# Patient Record
Sex: MALE | Race: WHITE
[De-identification: names, ages, dates, MRNs, and addresses within clinical notes are randomized per-mention and may not be internally consistent; named-entity substitution may affect disease eponyms.]

---

## 2017-04-07 ENCOUNTER — HOSPITAL ENCOUNTER (OUTPATIENT)
Dept: HOSPITAL 80 - FED | Age: 72
Setting detail: OBSERVATION
LOS: 1 days | Discharge: HOME | End: 2017-04-08
Attending: INTERNAL MEDICINE | Admitting: INTERNAL MEDICINE
Payer: COMMERCIAL

## 2017-04-07 DIAGNOSIS — E78.5: ICD-10-CM

## 2017-04-07 DIAGNOSIS — R79.89: ICD-10-CM

## 2017-04-07 DIAGNOSIS — Z82.49: ICD-10-CM

## 2017-04-07 DIAGNOSIS — I34.0: ICD-10-CM

## 2017-04-07 DIAGNOSIS — I48.91: Primary | ICD-10-CM

## 2017-04-07 DIAGNOSIS — Z87.891: ICD-10-CM

## 2017-04-07 LAB
% IMMATURE GRANULYOCYTES: 0.6 % (ref 0–1.1)
ABSOLUTE IMMATURE GRANULOCYTES: 0.06 10^3/UL (ref 0–0.1)
ABSOLUTE NRBC COUNT: 0 10^3/UL (ref 0–0.01)
ADD DIFF?: NO
ADD MORPH?: NO
ADD SCAN?: NO
ANION GAP SERPL CALC-SCNC: 11 MEQ/L (ref 8–16)
ATYPICAL LYMPHOCYTE FLAG: 0 (ref 0–99)
CALCIUM SERPL-MCNC: 10 MG/DL (ref 8.5–10.4)
CHLORIDE SERPL-SCNC: 105 MEQ/L (ref 97–110)
CO2 SERPL-SCNC: 25 MEQ/L (ref 22–31)
CREAT SERPL-MCNC: 1.3 MG/DL (ref 0.7–1.3)
ERYTHROCYTE [DISTWIDTH] IN BLOOD BY AUTOMATED COUNT: 13.3 % (ref 11.5–15.2)
FRAGMENT RBC FLAG: 0 (ref 0–99)
GFR SERPL CREATININE-BSD FRML MDRD: 54 ML/MIN/{1.73_M2}
GLUCOSE SERPL-MCNC: 107 MG/DL (ref 70–100)
HCT VFR BLD CALC: 47.3 % (ref 40–51)
HGB BLD-MCNC: 15.7 G/DL (ref 13.7–17.5)
LEFT SHIFT FLG: 0 (ref 0–99)
LIPEMIA HEMOLYSIS FLAG: 80 (ref 0–99)
MCH RBC BLDCO QN: 30.8 PG (ref 27.9–34.1)
MCHC RBC AUTO-ENTMCNC: 33.2 G/DL (ref 32.4–36.7)
MCV RBC AUTO: 92.9 FL (ref 81.5–99.8)
NRBC-AUTO%: 0 % (ref 0–0.2)
PLATELET # BLD: 197 10^3/UL (ref 150–400)
PLATELET CLUMPS FLAG: 0 (ref 0–99)
PMV BLD AUTO: 10.1 FL (ref 8.7–11.7)
POTASSIUM SERPL-SCNC: 4.1 MEQ/L (ref 3.5–5.2)
RBC # BLD AUTO: 5.09 10^6/UL (ref 4.4–6.38)
SODIUM SERPL-SCNC: 141 MEQ/L (ref 134–144)
TROPONIN I SERPL-MCNC: 0.09 NG/ML (ref 0–0.03)

## 2017-04-07 PROCEDURE — G0378 HOSPITAL OBSERVATION PER HR: HCPCS

## 2017-04-07 PROCEDURE — B245ZZ4 ULTRASONOGRAPHY OF LEFT HEART, TRANSESOPHAGEAL: ICD-10-PCS | Performed by: INTERNAL MEDICINE

## 2017-04-07 PROCEDURE — 96361 HYDRATE IV INFUSION ADD-ON: CPT

## 2017-04-07 PROCEDURE — 99285 EMERGENCY DEPT VISIT HI MDM: CPT

## 2017-04-07 PROCEDURE — 93005 ELECTROCARDIOGRAM TRACING: CPT

## 2017-04-07 PROCEDURE — 5A2204Z RESTORATION OF CARDIAC RHYTHM, SINGLE: ICD-10-PCS | Performed by: INTERNAL MEDICINE

## 2017-04-07 PROCEDURE — 96372 THER/PROPH/DIAG INJ SC/IM: CPT

## 2017-04-07 PROCEDURE — 93312 ECHO TRANSESOPHAGEAL: CPT

## 2017-04-07 PROCEDURE — 96374 THER/PROPH/DIAG INJ IV PUSH: CPT

## 2017-04-07 RX ADMIN — SODIUM CHLORIDE SCH MLS: 900 INJECTION, SOLUTION INTRAVENOUS at 23:00

## 2017-04-07 NOTE — EDPHY
H & P


Stated Complaint: tachycardia started while bike riding today, mild "indigestion

" currently


HPI/ROS: 





CHIEF COMPLAINT: Tachycardia





HISTORY OF PRESENT ILLNESS: The patient is a 73 y/o male, with a history of 

hypertension, arriving with his wife complaining of tachycardia he noticed just 

before exercising this morning. He noticed that his heart rate was elevated 

because he wears an exercise heart rate monitor; he denies associated chest pain

, palpitations, or dyspnea at any point. The monitor read a rate between 150-220

, but he thought it was inaccurate and chose to ride 20 miles on his bike 

regardless. He did not develop symptoms while riding, but currently feels "like 

I ate too much" and mildly lightheaded if he sits up. He has never felt these 

symptoms previously. He denies leg swelling, recent illness, vomiting, nausea, 

or recent trauma. He only drinks one cup of coffee daily and denies other 

caffeine use.





He reports mildly elevated lipids, but is not on medication currently. He 

denies history of diabetes or coronary artery disease. He is a former smoker 

and both parents had heart disease in old age. He takes a baby aspirin daily.





REVIEW OF SYSTEMS:





A ten point review of systems was performed and is negative with the exception 

of the items mentioned in the HPI.








Source: Patient


Exam Limitations: No limitations





- Personal History


Current Tetanus/Diphtheria Vaccine: Unsure


Current Tetanus Diphtheria and Acellular Pertussis (TDAP): Unsure





- Medical/Surgical History


PMH: 


PMH includes:


1. Hypertension - Losartan HTCZ


2. Right clavicle fracture and repair


3. Cataract surgery


4. Inguinal hernia repair





Family history: Father had CABG and stroke in his 80s; mother had unknown heart 

problems as well





PCP: Dr. Farley


Hx Asthma: No


Hx Chronic Respiratory Disease: No


Hx Diabetes: No


Hx Cardiac Disease: No


Hx Renal Disease: No


Hx Cirrhosis: No


Hx Alcoholism: No


Hx HIV/AIDS: No


Hx Splenectomy or Spleen Trauma: No


Other PMH: hypertension





- Social History


Smoking Status: Never smoked


Additional Social History: 





One cup of coffee daily, no other caffeine use. Rare alcohol use. Former smoker

, quit 20 years ago. No illicit drugs. Retired, former , and 

works on bike repair.








- Physical Exam


Exam: 





General Appearance:  Alert.  Vital signs reviewed. 109/76,  range


Eyes:  Pupils equal and round, no conjunctival injection, no discharge. 

Anicteric.


ENT, Mouth:  Mucous membranes are moist, no oropharyngeal erythema or edema.


Neck:  No lymphadenopathy, supple. No carotid bruit.


Respiratory:  Lungs are clear to auscultation; no wheezes, rales, or rhonchi.


Cardiovascular:  Tachycardic irregularly irregular heart rate; no murmur, rub, 

or gallop.


Gastrointestinal:  Abdomen is soft and nontender, no masses or organomegaly, 

bowel sounds normal.


Skin:  Warm and dry, no rashes on exposed skin, normal color.


Back:  Nontender to palpation over the thoracolumbar spine. No CVAT.


Extremities:  No lower extremity edema, no calf tenderness or swelling.


Neurological:  Alert and oriented.  Moving all four extremities easily and 

equally.


Psychiatric:  Normal affect.





Constitutional: 


 Initial Vital Signs











Temperature (C)  36.4 C   04/07/17 14:22


 


Heart Rate  122 H  04/07/17 14:22


 


Respiratory Rate  16   04/07/17 14:22


 


Blood Pressure  125/100 H  04/07/17 14:22


 


O2 Sat (%)  97   04/07/17 14:22








 











O2 Delivery Mode               Nasal Cannula


 


O2 (L/minute)                  2














Allergies/Adverse Reactions: 


 





No Known Allergies Allergy (Unverified 04/07/17 14:20)


 








Home Medications: 














 Medication  Instructions  Recorded


 


Aspirin [Aspirin 81mg (*)] 81 mg PO DAILY 04/07/17


 


Cholecalciferol Vit D3 [Vitamin D3 2,000 iunits PO DAILY 04/07/17





(*)]  


 


Cyanocobalamin [Vitamin B12 (*)] 2,000 mcg PO DAILY 04/07/17


 


Losartan Potassium [Cozaar 50 mg 50 mg PO DAILY 04/07/17





(*)]  


 


Multivitamins [Multivitamin (*)] 1 each PO DAILY 04/07/17


 


Omega-3 Fatty Acids [Fish Oil 1000 1,000 mg PO DAILY 04/07/17





mg (*)]  














Medical Decision Making





- Diagnostics


EKG Interpretation: 





1426: The 12 lead EKG was interpreted by myself. Rapid atrial fibrillation rate 

around 140, RBBB. See hard copy and/or "tracemaster" electronic copy for 

interpretation.





1556: Repeat 12 lead EKG was interpreted by myself.Atrial fibrillation rate 

around 110, RBBB. See hard copy and/or "tracemaster" electronic copy for 

interpretation.


ED Course/Re-evaluation: 





IV established. Labs drawn including CBC, CHEM, troponin, TSH. Patient placed 

on cardiac monitor. 1L IV NS and Diltazem IV 10 mg will be administered. 





Patient's troponin is elevated at .086. His EKG shows rapid atrial 

fibrillation. His rate has decreased to the 110 range since Diltazem 10 mg IV 

was given.  After the I viewed the ties in his blood pressure did decrease 

slightly; he has had systolic blood pressures just under 100. He will require 

admission for further cardiac work up.  He is given lovenox SQ. I discussed 

this with the patient and he agrees to admission. 





1530: Dr. Fernandez accepts admission.





He has remained stable without chest pain or shortness of breath during his 

stay in the emergency department.


Labs reviewed.


Differential Diagnosis: 





Considered a differential diagnosis that includes but is not limited to 

myocardial ischemia, electrolyte disorder, valvular disease, and hypertension.





- Data Points


Laboratory Results: 


 Laboratory Results





 04/07/17 14:40 





 04/07/17 14:40 





 











  04/07/17 04/07/17





  14:40 14:40


 


WBC    10.15 10^3/uL H 10^3/uL





    (3.80-9.50) 


 


RBC    5.09 10^6/uL 10^6/uL





    (4.40-6.38) 


 


Hgb    15.7 g/dL g/dL





    (13.7-17.5) 


 


Hct    47.3 % %





    (40.0-51.0) 


 


MCV    92.9 fL fL





    (81.5-99.8) 


 


MCH    30.8 pg pg





    (27.9-34.1) 


 


MCHC    33.2 g/dL g/dL





    (32.4-36.7) 


 


RDW    13.3 % %





    (11.5-15.2) 


 


Plt Count    197 10^3/uL 10^3/uL





    (150-400) 


 


MPV    10.1 fL fL





    (8.7-11.7) 


 


Neut % (Auto)    66.7 % %





    (39.3-74.2) 


 


Lymph % (Auto)    21.7 % %





    (15.0-45.0) 


 


Mono % (Auto)    8.8 % %





    (4.5-13.0) 


 


Eos % (Auto)    1.3 % %





    (0.6-7.6) 


 


Baso % (Auto)    0.9 % %





    (0.3-1.7) 


 


Nucleat RBC Rel Count    0.0 % %





    (0.0-0.2) 


 


Absolute Neuts (auto)    6.78 10^3/uL H 10^3/uL





    (1.70-6.50) 


 


Absolute Lymphs (auto)    2.20 10^3/uL 10^3/uL





    (1.00-3.00) 


 


Absolute Monos (auto)    0.89 10^3/uL H 10^3/uL





    (0.30-0.80) 


 


Absolute Eos (auto)    0.13 10^3/uL 10^3/uL





    (0.03-0.40) 


 


Absolute Basos (auto)    0.09 10^3/uL 10^3/uL





    (0.02-0.10) 


 


Absolute Nucleated RBC    0.00 10^3/uL 10^3/uL





    (0-0.01) 


 


Immature Gran %    0.6 % %





    (0.0-1.1) 


 


Immature Gran #    0.06 10^3/uL 10^3/uL





    (0.00-0.10) 


 


Sodium  141 mEq/L mEq/L  





   (134-144)  


 


Potassium  4.1 mEq/L mEq/L  





   (3.5-5.2)  


 


Chloride  105 mEq/L mEq/L  





   ()  


 


Carbon Dioxide  25 mEq/l mEq/l  





   (22-31)  


 


Anion Gap  11 mEq/L mEq/L  





   (8-16)  


 


BUN  32 mg/dL H mg/dL  





   (7-23)  


 


Creatinine  1.3 mg/dL mg/dL  





   (0.7-1.3)  


 


Estimated GFR  54   





   


 


Glucose  107 mg/dL H mg/dL  





   ()  


 


Calcium  10.0 mg/dL mg/dL  





   (8.5-10.4)  


 


Troponin I  0.086 ng/mL H ng/mL  





   (0-0.034)  


 


TSH  1.880 uIU/mL uIU/mL  





   (0.465-4.680)  











Medications Given: 


 








Discontinued Medications





Diltiazem HCl (Cardizem 25 Mg/5 Ml Vial)  10 mg IVP EDNOW ONE


   Stop: 04/07/17 14:53


   Last Admin: 04/07/17 15:00 Dose:  10 mg


Enoxaparin Sodium (Lovenox)  80 mg SC EDNOW ONE


   Stop: 04/07/17 15:36


   Last Admin: 04/07/17 16:20 Dose:  80 mg


Sodium Chloride (Ns)  1,000 mls @ 0 mls/hr IV ONCE ONE


   PRN Reason: Wide Open


   Stop: 04/07/17 14:46


   Last Admin: 04/07/17 14:45 Dose:  1,000 mls








Departure





- Departure


Disposition: [facility] Inpatient Acute


Clinical Impression: 


 Atrial fibrillation, rapid, New onset a-fib





Condition: Fair


Report Scribed for: Viktoria Siegel


Report Scribed by: Ana Maravilla


Date of Report: 04/07/17


Time of Report: 14:54


Physician Review and Approval Statement: 





04/07/17 14:34


Portions of this note were transcribed by the medical scribe.  I, Dr. Viktoria Siegel, personally performed the history, physical exam, and medical decision-

making; and confirmed the accuracy of the information in the transcribed note.

## 2017-04-07 NOTE — PDGENHP
History and Physical





- Chief Complaint


fast heart rate





- History of Present Illness


73 yo M with no PMH other than HTN presenting with a fast heart rate that he 

noted on his HR monitor prior to starting out on his usual bike ride. He notes 

that when he set out to bike, the monitor was reading at 140-150, but he felt 

fine and thought the monitor was malfunctioning. He biked for a half mile or so

, and the monitor continued reading at an unusually high rate, so he ultimately 

turned it off to reboot it, and completed his 20 mile bike ride. When he 

returned home and had completely rebooted the monitor, he found that it was 

still reading his heart rate at around 150 and for that reason he thought he 

should come to the ER for further evaluation. This has not happened in the past 

that he is aware of, and he does routinely wear this monitor when cycling. He 

denies any chest pain that he can recall, today or in the past, but he does 

note that today he was experiencing sxs of what felt like indigestion. He also 

had a bit of light headedness during his ride, and notes that he has had that 

at times when riding, and seems to be having it more often recently. He has not 

had any swelling in his legs, he has not had any shortness of breath, he has 

not felt ill recently. He has not had any palpitations or sense of his heart 

beating fast. 





History Information





- Allergies/Home Medication List


Allergies/Adverse Reactions: 








No Known Allergies Allergy (Unverified 04/07/17 14:20)


 





Home Medications: 








Aspirin [Aspirin 81mg (*)] 81 mg PO DAILY 04/07/17 [Last Taken 04/07/17]


Cholecalciferol Vit D3 [Vitamin D3 (*)] 2,000 iunits PO DAILY 04/07/17 [Last 

Taken 04/07/17]


Cyanocobalamin [Vitamin B12 (*)] 2,000 mcg PO DAILY 04/07/17 [Last Taken 04/07/ 17]


Losartan Potassium [Cozaar 50 mg (*)] 50 mg PO DAILY 04/07/17 [Last Taken 04/07/ 17]


Multivitamins [Multivitamin (*)] 1 each PO DAILY 04/07/17 [Last Taken Unknown]


Omega-3 Fatty Acids [Fish Oil 1000 mg (*)] 1,000 mg PO DAILY 04/07/17 [Last 

Taken 04/07/17]





I have personally reviewed and updated: family history, medical history, social 

history, surgical history





- Past Medical History


hypertension





- Surgical History


Reports: hernia repair (inguinal)


Additional surgical history: clavicle repair.  cataract surgery





- Family History


Positive for: CAD (father with cabg in his 80s, mother with heart problems), 

stroke (mother with cva in her 80s)





- Social History


Smoking Status: Former smoker (quit in 1990s, prior 20-30 pack year smoking hx)


Alcohol Use: Occasionally


Drug Use: None


Additional social history: , 2 children





Review of Systems


ROS: 10pt was reviewed & negative except for what was stated in HPI & below





Physical Exam














Temp Pulse Resp BP Pulse Ox


 


 36.8 C   135 H  19   109/76   96 


 


 04/07/17 16:47  04/07/17 17:44  04/07/17 16:47  04/07/17 16:47  04/07/17 16:47











Constitutional: no apparent distress, appears nourished


Eyes: PERRL, anicteric sclera


Ears, Nose, Mouth, Throat: moist mucous membranes, hearing normal


Cardiovascular: irregularly irregular, tachycardia, No systolic murmur, No edema


Respiratory: no respiratory distress, no rales or rhonchi, clear to auscultation


Gastrointestinal: normoactive bowel sounds, soft, non-tender abdomen, no 

palpable masses


Genitourinary: no bladder tenderness


Skin: warm, normal color


Musculoskeletal: full muscle strength, no muscle tenderness


Neurologic: AAOx3


Psychiatric: not anxious, not encephalopathic, thought process linear, flat 

affect





Lab Data & Imaging Review





 04/07/17 14:40





 04/07/17 14:40














WBC  10.15 10^3/uL (3.80-9.50)  H  04/07/17  14:40    


 


RBC  5.09 10^6/uL (4.40-6.38)   04/07/17  14:40    


 


Hgb  15.7 g/dL (13.7-17.5)   04/07/17  14:40    


 


Hct  47.3 % (40.0-51.0)   04/07/17  14:40    


 


MCV  92.9 fL (81.5-99.8)   04/07/17  14:40    


 


MCH  30.8 pg (27.9-34.1)   04/07/17  14:40    


 


MCHC  33.2 g/dL (32.4-36.7)   04/07/17  14:40    


 


RDW  13.3 % (11.5-15.2)   04/07/17  14:40    


 


Plt Count  197 10^3/uL (150-400)   04/07/17  14:40    


 


MPV  10.1 fL (8.7-11.7)   04/07/17  14:40    


 


Neut % (Auto)  66.7 % (39.3-74.2)   04/07/17  14:40    


 


Lymph % (Auto)  21.7 % (15.0-45.0)   04/07/17  14:40    


 


Mono % (Auto)  8.8 % (4.5-13.0)   04/07/17  14:40    


 


Eos % (Auto)  1.3 % (0.6-7.6)   04/07/17  14:40    


 


Baso % (Auto)  0.9 % (0.3-1.7)   04/07/17  14:40    


 


Nucleat RBC Rel Count  0.0 % (0.0-0.2)   04/07/17  14:40    


 


Absolute Neuts (auto)  6.78 10^3/uL (1.70-6.50)  H  04/07/17  14:40    


 


Absolute Lymphs (auto)  2.20 10^3/uL (1.00-3.00)   04/07/17  14:40    


 


Absolute Monos (auto)  0.89 10^3/uL (0.30-0.80)  H  04/07/17  14:40    


 


Absolute Eos (auto)  0.13 10^3/uL (0.03-0.40)   04/07/17  14:40    


 


Absolute Basos (auto)  0.09 10^3/uL (0.02-0.10)   04/07/17  14:40    


 


Absolute Nucleated RBC  0.00 10^3/uL (0-0.01)   04/07/17  14:40    


 


Immature Gran %  0.6 % (0.0-1.1)   04/07/17  14:40    


 


Immature Gran #  0.06 10^3/uL (0.00-0.10)   04/07/17  14:40    


 


Sodium  141 mEq/L (134-144)   04/07/17  14:40    


 


Potassium  4.1 mEq/L (3.5-5.2)   04/07/17  14:40    


 


Chloride  105 mEq/L ()   04/07/17  14:40    


 


Carbon Dioxide  25 mEq/l (22-31)   04/07/17  14:40    


 


Anion Gap  11 mEq/L (8-16)   04/07/17  14:40    


 


BUN  32 mg/dL (7-23)  H  04/07/17  14:40    


 


Creatinine  1.3 mg/dL (0.7-1.3)   04/07/17  14:40    


 


Estimated GFR  54   04/07/17  14:40    


 


Glucose  107 mg/dL ()  H  04/07/17  14:40    


 


Calcium  10.0 mg/dL (8.5-10.4)   04/07/17  14:40    


 


Troponin I  0.086 ng/mL (0-0.034)  H  04/07/17  14:40    


 


TSH  1.880 uIU/mL (0.465-4.680)   04/07/17  14:40    








Visualized and Interpreted EKG results: Yes


EKG Interpretation: Positive for: other (a fib, rate 150s), right bundle branch 

block





Assessment & Plan


Assessment: 








Atrial fibrillation, rapid (Acute)


New onset a-fib (Acute)





73 yo M with PMH of HTN presenting with new onset a fib w/rvr





# a fib w/rvr: presumably new onset, essentially asymptomatic however so 

difficult to be sure duration of sxs. Given IV dilt in ER with improvement in 

rate, but still in 110s, will start dilt gtt. Monitoring on tele, obtaining 

serial trops/ecgs. Etiology for a fib unclear--no s/s of infection, no e/o chf 

on exam, does not complain of chest pain to suggest ACS. Will get UA, echo in am

, check tsh. Cards consulted. 


# elevated trop: in setting of above and likely demand ischemia related to a fib

, w/u as above


# htn: bp has been on the low end of normal since arrival, will resume losartan/

hctz if bp begins to increase but holding for now given addition of dilt


# pre renal azotemia: with BUN of 32 and creat at 1.3, suspect an element of 

volume depletion post prolonged bike ride and minimal PO intake today, will 

give IVF and monitor overnight


# leukocytosis: likely stress response, checking UA


# observation status, likely needs < 48 hours stay for eval/mgmt of above


Patient new to my care. Old records reviewed and summarized as above. Further 

hx obtained from patients wife present at bedside. Care plan reviewed with ER 

doctor including plans for diltiazem.

## 2017-04-07 NOTE — CPEKG
Heart Rate: 142

RR Interval: 423

QRSD Interval: 132

QT Interval: 336

QTC Interval: 517

QRS Axis: 41

T Wave Axis: -25

EKG Severity - ABNORMAL ECG -

EKG Impression: ATRIAL FIBRILLATION

EKG Impression: RIGHT BUNDLE BRANCH BLOCK

Electronically Signed By: Vanna Mckoy 08-Apr-2017 19:23:28

## 2017-04-07 NOTE — CPEKG
Heart Rate: 120

RR Interval: 500

QRSD Interval: 130

QT Interval: 344

QTC Interval: 486

QRS Axis: 47

T Wave Axis: -14

EKG Severity - ABNORMAL ECG -

EKG Impression: ATRIAL FIBRILLATION

EKG Impression: RIGHT BUNDLE BRANCH BLOCK

Electronically Signed By: Vanna Mckoy 08-Apr-2017 19:22:41

## 2017-04-08 VITALS
OXYGEN SATURATION: 91 % | TEMPERATURE: 98.7 F | RESPIRATION RATE: 20 BRPM | HEART RATE: 73 BPM | DIASTOLIC BLOOD PRESSURE: 84 MMHG | SYSTOLIC BLOOD PRESSURE: 123 MMHG

## 2017-04-08 LAB
ANION GAP SERPL CALC-SCNC: 8 MEQ/L (ref 8–16)
CALCIUM SERPL-MCNC: 9.1 MG/DL (ref 8.5–10.4)
CHLORIDE SERPL-SCNC: 107 MEQ/L (ref 97–110)
CHOLEST SERPL-MCNC: 160 MG/DL (ref 140–220)
CHOLEST/HDLC SERPL: 4.71 RATIO (ref 1–4.97)
CO2 SERPL-SCNC: 25 MEQ/L (ref 22–31)
COLOR UR: YELLOW
CREAT SERPL-MCNC: 1.1 MG/DL (ref 0.7–1.3)
GFR SERPL CREATININE-BSD FRML MDRD: > 60 ML/MIN/{1.73_M2}
GLUCOSE SERPL-MCNC: 95 MG/DL (ref 70–100)
HIGH DENSITY LIPOPROTEIN: 34 MG/DL (ref 40–65)
LDLC/HDLC SERPL: 2.71 RATIO (ref 1–3.64)
LOW DENSITY LIPOPROTEIN: 92 MG/DL (ref 80–100)
NITRITE UR QL STRIP: NEGATIVE
NON-HIGH DENSITY LIPOPROTEIN: 126 MG/DL (ref 90–129)
PH UR STRIP: 5 [PH] (ref 5–7.5)
POTASSIUM SERPL-SCNC: 4.7 MEQ/L (ref 3.5–5.2)
SODIUM SERPL-SCNC: 140 MEQ/L (ref 134–144)
SP GR UR STRIP: 1.01 (ref 1–1.03)
TRIGL SERPL-MCNC: 173 MG/DL (ref 40–150)
TROPONIN I SERPL-MCNC: 0.62 NG/ML (ref 0–0.03)
VERY LOW DENSITY LIPOPROTEINS: 34 MG/DL (ref 8–25)

## 2017-04-08 RX ADMIN — SODIUM CHLORIDE SCH MLS: 900 INJECTION, SOLUTION INTRAVENOUS at 08:40

## 2017-04-08 NOTE — GCON
[f rep st]



                                                                    CONSULTATION





CARDIOLOGY CONSULTATION



DATE OF CONSULTATION:  04/08/2017





CHIEF COMPLAINT:  Atrial fibrillation.



HISTORY OF PRESENT ILLNESS:  We were asked by Dr. Fernandez to visit with the patient.  The patient 
is a pleasant 72-year-old male with a past history of hypertension and borderline dyslipidemia.  Yes
terday he was in his usual state of good health.  He was starting out for a bike ride.  Just after r
iding a block he noticed that his heart rate was 150 beats per minute based on his heart rate monito
r that was part of the bike.  He did not believe this was true so he decided to ride 20 miles.  Duri
ng the bike ride he felt intermittently lightheaded and had upper abdominal discomfort described as 
a sensation that he had eaten too much.  He did not have chest heaviness.  He did not have undue rony
rtness of breath.  He denied syncope.  He did not actually feel palpitations. 



He returned home after the bike ride, but continued to have high heart rates even up to 200 and ther
efore presented to the emergency department.  He was found to be in atrial fibrillation with rapid v
entricular response and was admitted for observation.  He was started on a diltiazem drip, full-dose
 Lovenox.  He had a borderline elevated troponin. 



Upon my evaluation this morning, he appears comfortable.  No more indigestion-type symptoms.  He is 
not lightheaded.  No chest pain.



REVIEW OF SYSTEMS:  A full 10-point review of systems is performed and is negative except that which
 is outlined above.  Specifically, he has no history of major bleeding problems.  He has no history 
of TIA or stroke.  He denies snoring.  He denies heavy alcohol intake.  No previous history of palpi
tations or known atrial fibrillation.



ALLERGIES:  No known drug allergies.



PAST MEDICAL HISTORY:  

1.  Hypertension.

2.  Atrial fibrillation, new diagnosis.

3.  Borderline dyslipidemia.

4.  Status post inguinal hernia repair.



MEDICATIONS:  Losartan 50 mg daily, aspirin 81 mg daily, vitamin D3, vitamin B12, multivitamin, omeg
a-3 fatty acids.



SOCIAL HISTORY:  The patient is .  His wife is at the bedside.  He used to own a bike shop in
 [place].  He drinks alcohol very occasionally.  He denies heavy alcohol use.  He denies use of stimula
nts.  He has a remote tobacco history, having quit in the 1990s.



FAMILY HISTORY:  Notable for father with CABG in his 80s.  Mother with CVA in her 80s.



PHYSICAL EXAM:  VITAL SIGNS:  Blood pressure 134/85, heart rate 87, heart rate was 122 on admission,
 respiratory rate is 14, oxygen saturation 98% on 2 L nasal cannula.  He is afebrile.  GENERAL:  Wel
l-appearing older male in no acute distress.  HEENT:  Sclerae clear.  No jaundice.  Mucous members a
re moist.  Normocephalic, atraumatic.  CARDIOVASCULAR:  JVP less than 10.  Carotids equal and 2+ jessica
aterally without bruit.  Irregular rate, irregular rhythm.  No murmur.  No rub.  No S3.  LUNGS:  Piero
ar to auscultation bilaterally without wheezes, rhonchi, or rales.  ABDOMEN:  Soft, nontender, nondi
stended without bruits, masses or hepatosplenomegaly.  EXTREMITIES:  Warm and well perfused without 
cyanosis, clubbing, or edema.  NEURO:  Alert and oriented x3 without gross focal neurologic deficits
.



LABORATORY DATA:  White count 10.2, hematocrit 47, platelets 197, sodium 140, potassium 4.7, chlorid
e 107, bicarb 25, BUN 24, creatinine 1.1 down from 1.3 on admission, glucose is 95.  Troponin 0.086,
 0.729, 0.62.  TSH 1.88.  Urinalysis is normal. 



EKG reviewed by me x2:  Initially atrial fibrillation with rapid ventricular response, right bundle 
branch block.  Second EKG shows atrial fibrillation with controlled ventricular response, right bund
le branch block.



ASSESSMENT AND PLAN:  A 72-year-old male with a new diagnosis of atrial fibrillation.  I suspect thi
s is fairly new onset, although we cannot be sure that he has not been having nocturnal atrial fibri
llation.  He does not appear to be in heart failure.  He did have a minimally positive troponin, but
 has not had chest pain and his EKG does not show acute ischemic changes.

1.  Atrial fibrillation.  He has been rate controlled with diltiazem.  We will switch to oral metopr
olol.  I have also had a long discussion with the patient and his wife about the risks and benefits 
of anticoagulation.  He agrees to Eliquis therapy.  His CHADS2-VASc score is 2; therefore, Eliquis i
s warranted to reduce the risk of thromboembolic stroke.  TSH is normal.  I do not think he has slee
p apnea.  We will plan for a DIANN-guided cardioversion today.  Risks, benefits alternatives of the pr
ocedure were discussed.  The patient agrees to proceed.

2.  Positive troponin.  This is likely demand ischemia in the setting of rapid ventricular response.
  He did have some indigestion like symptoms, which could be an anginal equivalent.  No indication f
or cardiac catheterization.  Recommend outpatient exercise nuclear stress test for further evaluatio
n next week in our office.

3.  Hypertension.  He is on Cozaar.  Add low-dose beta blocker.

4.  Dyslipidemia.  He reports that he has had borderline levels in the past that have come down with
 diet changes.  We will check lipid profile now and discuss at followup. 



Thank you for allowing us to participate in the patient's care.  We will follow with you.





Job #:  [id number]

## 2017-04-08 NOTE — ECHO
[id number]

[id number]



+---------------------------------------------------+      [address]

:                                                   :       [facility] [address]

:                                                   :           [phone]

+---------------------------------------------------+       Fax [phone]

                  Transesophageal Echocardiographic Report

+----------------------------------------------------------------------------

-------+

:Name: MENDEL MATA BStudy Date: 2017 09:46 AM                      

       :

:MRN: [id number]        Hospital Admission Number: [id number] Locati

on: PCU:

:: 1945        Gender: Male                                         

       :

:Age: 72 yrs            Race: WH                                             

       :

:Reason For Study: Eval LV FX                                                

       :

:History: Pre Cardioversion                                                  

       :

+----------------------------------------------------------------------------

-------+



Left Ventricle

The left ventricular ejection fraction is normal.







Atria

Injection of contrast documented no interatrial shunt. The interatrial

septum is intact with no evidence for an atrial septal defect. No left

atrial mass or thrombus visualized. No thrombus is detected in the left

atrial appendage.





Mitral Valve

The mitral valve is normal. There is no mitral valve stenosis. There is mild

mitral regurgitation.



Tricuspid Valve

Normal tricuspid valve. There is trace tricuspid regurgitation.



Aortic Valve

The aortic valve is normal in structure and function. The aortic valve is

trileaflet. There is no aortic stenosis. Trace aortic regurgitation.



Pulmonic Valve

The pulmonic valve is normal in structure and function. Trace pulmonic

valvular regurgitation.



Vessels

The aortic root is normal size.





Procedure

Sedation was provided by Dr. Taylor. NPO status confirmed, timeout performed,

and informed consent obtained. DIANN probe passed without difficulty. No

complications.



Conclusion

A 2D transesophageal echocardiogram with color flow Doppler was performed.

The left ventricular ejection fraction is normal.

Injection of contrast documented no interatrial shunt.

The interatrial septum is intact with no evidence for an atrial septal

defect.

No left atrial mass or thrombus visualized.

No thrombus is detected in the left atrial appendage.

The mitral valve is normal.

There is mild mitral regurgitation.

There is trace tricuspid regurgitation.

The aortic valve is normal in structure and function.

The aortic valve is trileaflet.

Trace aortic regurgitation.

Trace pulmonic valvular regurgitation.

Proceeded with successful elective DC cardioversion.



_____________________________________________________________________________







Final Reading Physician:

                        Dr Francisca Lopez  electronically signed on 2017

                        03:30 PM

Ordering Physician: Francisca Lopez

Performed By: Dr Francisca Lopez

## 2017-04-08 NOTE — CPEKG
Heart Rate: 72

RR Interval: 833

P-R Interval: 176

QRSD Interval: 138

QT Interval: 428

QTC Interval: 469

P Axis: 57

QRS Axis: 30

T Wave Axis: -4

EKG Severity - ABNORMAL ECG -

EKG Impression: SINUS RHYTHM

EKG Impression: RIGHT BUNDLE BRANCH BLOCK

EKG Impression: SINUS RHYTHM HAS REPLACED ATRIAL FIBRILLATION

Electronically Signed By: Mark Garcia 08-Apr-2017 21:19:07

## 2017-04-08 NOTE — HOSPPROG
Hospitalist Progress Note


Assessment/Plan: 





73 yo m new AF


cardioverted


home today


   see dc summary


Subjective: cardioverted.  in sinus.  ready for dc


Objective: 


 Vital Signs











Temp Pulse Resp BP Pulse Ox


 


 37.1 C   73   20   123/84 H  91 L


 


 04/08/17 13:35  04/08/17 13:35  04/08/17 13:35  04/08/17 13:35  04/08/17 13:35








 Laboratory Results





 04/08/17 03:43 





 











 04/07/17 04/08/17 04/09/17





 05:59 05:59 05:59


 


Intake Total  1300 


 


Balance  1300 














- Physical Exam


Constitutional: no apparent distress, appears nourished


Eyes: PERRL, anicteric sclera


Ears, Nose, Mouth, Throat: moist mucous membranes, hearing normal


Cardiovascular: regular rate and rhythym, no murmur, rub, or gallop


Respiratory: no respiratory distress, no rales or rhonchi


Gastrointestinal: normoactive bowel sounds, soft, non-tender abdomen


Genitourinary: No membreno in urethra


Skin: warm, normal color


Musculoskeletal: full muscle strength, no muscle tenderness


Neurologic: AAOx3, sensation intact bilaterally





ICD10 Worksheet


Patient Problems: 


 Problems











Problem Status Onset


 


Atrial fibrillation, rapid Acute  


 


New onset a-fib Acute

## 2017-04-08 NOTE — CPEKG
Heart Rate: 84

RR Interval: 714

QRSD Interval: 126

QT Interval: 420

QTC Interval: 497

QRS Axis: 34

T Wave Axis: -4

EKG Severity - ABNORMAL ECG -

EKG Impression: ATRIAL FIBRILLATION, V-RATE 

EKG Impression: RIGHT BUNDLE BRANCH BLOCK

Electronically Signed By: Mark Garcia 08-Apr-2017 21:18:27

## 2017-04-08 NOTE — GDS
[f rep st]



                                                             DISCHARGE SUMMARY





DISCHARGE DIAGNOSES:  

1.  New atrial fibrillation.  

2.  Hypertension.



PROCEDURES DURING THIS ADMISSION:  DIANN cardioversion performed by Dr. Francisca Lopez.  



The patient presented with a tachycardia seen on his heart rate monitor.  He had low-level indetermi
jojo troponins that trended down to 0.  He was discharged home on Eliquis and metoprolol.





Job #:  [id number]

## 2017-04-08 NOTE — CPIP
[f rep st]



                                                       INVASIVE CARDIAC PROCEDURE





DATE OF PROCEDURE:  04/08/2017



PROCEDURE:  Transesophageal echocardiography-guided cardioversion.



INDICATIONS:  Symptomatic atrial fibrillation.



COMPLICATIONS:  None.



DESCRIPTION OF PROCEDURE:  Sedation was provided by Dr. Taylor of the anesthesia service.  N.p.o. sta
tus was confirmed, informed consent obtained, and timeout performed.  After adequate conscious sedat
ion, the DIANN probe was passed without difficulty.  Images obtained.  Please see separate report.  



In summary, normal LV systolic function.  Mild mitral regurgitation.  Mild aortic regurgitation.  No
 intracardiac thrombus.  Negative bubble study. 



We proceeded with cardioversion.  The patient received a single 200 joule synchronized shock which c
onverted him from atrial fibrillation to normal sinus rhythm.  A 12-lead EKG is pending. 



He tolerated the procedure well.



CONCLUSIONS:  Successful DIANN-guided cardioversion.  Results discussed with the patient's wife.  The 
patient will be started on Eliquis and low-dose beta-blocker.  Anticipate discharge later today.





Job #:  [id number]

## 2017-11-16 ENCOUNTER — HOSPITAL ENCOUNTER (OUTPATIENT)
Dept: HOSPITAL 80 - BMCIMAGING | Age: 72
End: 2017-11-16
Attending: INTERNAL MEDICINE
Payer: COMMERCIAL

## 2017-11-16 DIAGNOSIS — R10.11: Primary | ICD-10-CM

## 2017-11-16 DIAGNOSIS — K76.89: ICD-10-CM

## 2017-11-16 DIAGNOSIS — R93.8: ICD-10-CM

## 2018-06-14 ENCOUNTER — HOSPITAL ENCOUNTER (OUTPATIENT)
Dept: HOSPITAL 80 - BMCIMAGING | Age: 73
End: 2018-06-14
Attending: INTERNAL MEDICINE
Payer: COMMERCIAL

## 2018-06-14 DIAGNOSIS — K76.89: Primary | ICD-10-CM

## 2018-12-10 ENCOUNTER — HOSPITAL ENCOUNTER (OUTPATIENT)
Dept: HOSPITAL 80 - BMCIMAGING | Age: 73
End: 2018-12-10
Attending: INTERNAL MEDICINE
Payer: COMMERCIAL

## 2018-12-10 DIAGNOSIS — I71.4: Primary | ICD-10-CM
